# Patient Record
Sex: FEMALE | Race: WHITE | NOT HISPANIC OR LATINO | Employment: OTHER | URBAN - METROPOLITAN AREA
[De-identification: names, ages, dates, MRNs, and addresses within clinical notes are randomized per-mention and may not be internally consistent; named-entity substitution may affect disease eponyms.]

---

## 2017-01-16 ENCOUNTER — OFFICE VISIT (OUTPATIENT)
Dept: CARDIOLOGY | Facility: CLINIC | Age: 77
End: 2017-01-16

## 2017-01-16 VITALS
WEIGHT: 188 LBS | SYSTOLIC BLOOD PRESSURE: 120 MMHG | HEIGHT: 64 IN | BODY MASS INDEX: 32.1 KG/M2 | DIASTOLIC BLOOD PRESSURE: 80 MMHG | HEART RATE: 58 BPM

## 2017-01-16 DIAGNOSIS — I48.19 PERSISTENT ATRIAL FIBRILLATION (HCC): Primary | ICD-10-CM

## 2017-01-16 PROCEDURE — 93000 ELECTROCARDIOGRAM COMPLETE: CPT | Performed by: INTERNAL MEDICINE

## 2017-01-16 PROCEDURE — 99213 OFFICE O/P EST LOW 20 MIN: CPT | Performed by: INTERNAL MEDICINE

## 2017-01-16 RX ORDER — MELATONIN
1000 DAILY
COMMUNITY

## 2017-01-16 RX ORDER — CA/D3/MAG OX/ZINC/COP/MANG/BOR 600 MG-800
1 TABLET,CHEWABLE ORAL DAILY
COMMUNITY

## 2017-01-16 NOTE — PROGRESS NOTES
Date of Office Visit: 2017  Encounter Provider: Chuck Sauceda MD  Place of Service: [facility] CARDIOLOGY  Patient Name: Robyn Lambert  :1940  [id number]    Chief Complaint   Patient presents with   • Atrial Fibrillation   :     HPI: Robyn Lambert is a 76 y.o. female   She is here for followup of her persistent atrial fibrillation.  She has been feeling well.  No palpitations, chest pain, shortness of breath, PND, orthopnea, edema or syncope.  No bleeding difficulty.  Her weight is down 8 pounds.          Past Medical History   Diagnosis Date   • Abnormal stress test      myocardial perfusion   • Arthritis    • Atrial fibrillation    • Cancer    • Health care maintenance    • Hyperlipidemia    • Osteopenia        Past Surgical History   Procedure Laterality Date   • Vulva surgery     • Tonsillectomy     • Appendectomy     • Bladder surgery N/A 2008       Social History     Social History   • Marital status:      Spouse name: N/A   • Number of children: N/A   • Years of education: N/A     Occupational History   • Not on file.     Social History Main Topics   • Smoking status: Never Smoker   • Smokeless tobacco: Not on file   • Alcohol use No   • Drug use: No   • Sexual activity: Not on file     Other Topics Concern   • Not on file     Social History Narrative       Family History   Problem Relation Age of Onset   • Hypertension Mother    • Stroke Mother    • Cancer Mother    • Heart attack Father    • Sudden death Father    • Stroke Maternal Grandmother    • Heart disease Neg Hx        Review of Systems   Constitution: Negative for decreased appetite, fever, malaise/fatigue and weight loss.   HENT: Negative for nosebleeds.    Eyes: Negative for double vision.   Cardiovascular: Negative for chest pain, claudication, cyanosis, dyspnea on exertion, irregular heartbeat, leg swelling, near-syncope, orthopnea, palpitations, paroxysmal nocturnal dyspnea and  "syncope.   Respiratory: Negative for cough, hemoptysis and shortness of breath.    Hematologic/Lymphatic: Negative for bleeding problem.   Skin: Negative for rash.   Musculoskeletal: Negative for falls and myalgias.   Gastrointestinal: Negative for hematochezia, jaundice, melena, nausea and vomiting.   Genitourinary: Negative for hematuria.   Neurological: Negative for dizziness and seizures.   Psychiatric/Behavioral: Negative for altered mental status and memory loss.       Allergies   Allergen Reactions   • Latex Rash         Current Outpatient Prescriptions:   •  atenolol (TENORMIN) 25 MG tablet, 1/2 tab po BID, Disp: 90 tablet, Rfl: 3  •  cholecalciferol (VITAMIN D3) 1000 UNITS tablet, Take 1,000 Units by mouth Daily., Disp: , Rfl:   •  Probiotic Product (PROBIOTIC ADVANCED) capsule, Take  by mouth., Disp: , Rfl:   •  rivaroxaban (XARELTO) 20 MG tablet, Take 1 tablet by mouth daily. (Patient taking differently: Take 20 mg by mouth daily with dinner.), Disp: 90 tablet, Rfl: 3     Objective:     Vitals:    01/16/17 1120   BP: 120/80   Pulse: 58   Weight: 188 lb (85.3 kg)   Height: 63.5\" (161.3 cm)     Body mass index is 32.78 kg/(m^2).    Physical Exam   Constitutional: She is oriented to person, place, and time. She appears well-developed and well-nourished.   HENT:   Head: Normocephalic.   Eyes: No scleral icterus.   Neck: No JVD present. No thyromegaly present.   Cardiovascular: Normal rate and normal heart sounds.  An irregularly irregular rhythm present. Exam reveals no gallop and no friction rub.    No murmur heard.  Pulmonary/Chest: Effort normal and breath sounds normal. She has no wheezes. She has no rales.   Abdominal: Soft. There is no hepatosplenomegaly. There is no tenderness.   Musculoskeletal: Normal range of motion. She exhibits no edema.   Lymphadenopathy:     She has no cervical adenopathy.   Neurological: She is alert and oriented to person, place, and time.   Skin: Skin is warm and dry. No rash " noted.   Psychiatric: She has a normal mood and affect.         ECG 12 Lead  Date/Time: 1/16/2017 12:01 PM  Performed by: DEEP SAUCEDA  Authorized by: DEEP SAUCEDA   Comparison: compared with previous ECG   Rhythm: atrial fibrillation  Clinical impression: abnormal ECG             Assessment:       Diagnosis Plan   1. Persistent atrial fibrillation            Plan:       Atrial Fibrillation and Atrial Flutter  Assessment  • The patient has persistent atrial fibrillation  • This is non-valvular in etiology  • The patient's CHADS2-VASc score is 3  • A [id number] score of 2 or more is considered a high risk for a thromboembolic event  • Rivaroxaban prescribed    Plan  • Continue in atrial fibrillation with rate control  • Continue rivaroxaban for antithrombotic therapy, bleeding issues discussed  • Continue beta blocker for rate control      As always, it has been a pleasure to participate in your patient's care.      Sincerely,       Deep Sauceda MD

## 2017-06-23 ENCOUNTER — TRANSCRIBE ORDERS (OUTPATIENT)
Dept: ADMINISTRATIVE | Facility: HOSPITAL | Age: 77
End: 2017-06-23

## 2017-06-23 DIAGNOSIS — Z12.31 VISIT FOR SCREENING MAMMOGRAM: Primary | ICD-10-CM

## 2017-07-30 DIAGNOSIS — I48.19 PERSISTENT ATRIAL FIBRILLATION (HCC): ICD-10-CM

## 2017-07-31 RX ORDER — ATENOLOL 25 MG/1
TABLET ORAL
Qty: 90 TABLET | Refills: 1 | Status: SHIPPED | OUTPATIENT
Start: 2017-07-31 | End: 2018-01-18

## 2017-08-18 ENCOUNTER — TRANSCRIBE ORDERS (OUTPATIENT)
Dept: ADMINISTRATIVE | Facility: HOSPITAL | Age: 77
End: 2017-08-18

## 2017-08-18 DIAGNOSIS — R92.8 FOLLOW-UP EXAMINATION OF ABNORMAL MAMMOGRAM: Primary | ICD-10-CM

## 2017-11-06 DIAGNOSIS — I48.19 PERSISTENT ATRIAL FIBRILLATION (HCC): ICD-10-CM

## 2017-11-07 ENCOUNTER — HOSPITAL ENCOUNTER (OUTPATIENT)
Dept: MAMMOGRAPHY | Facility: HOSPITAL | Age: 77
Discharge: HOME | End: 2017-11-07
Admitting: FAMILY MEDICINE

## 2017-11-07 DIAGNOSIS — R92.8 FOLLOW-UP EXAMINATION OF ABNORMAL MAMMOGRAM: ICD-10-CM

## 2017-11-07 PROCEDURE — G0204 DX MAMMO INCL CAD BI: HCPCS

## 2018-01-18 ENCOUNTER — OFFICE VISIT (OUTPATIENT)
Dept: CARDIOLOGY | Facility: CLINIC | Age: 78
End: 2018-01-18

## 2018-01-18 ENCOUNTER — TELEPHONE (OUTPATIENT)
Dept: CARDIOLOGY | Facility: CLINIC | Age: 78
End: 2018-01-18

## 2018-01-18 VITALS
WEIGHT: 201 LBS | OXYGEN SATURATION: 98 % | DIASTOLIC BLOOD PRESSURE: 82 MMHG | HEIGHT: 63 IN | BODY MASS INDEX: 35.61 KG/M2 | HEART RATE: 68 BPM | SYSTOLIC BLOOD PRESSURE: 124 MMHG

## 2018-01-18 DIAGNOSIS — I48.19 PERSISTENT ATRIAL FIBRILLATION (HCC): Primary | ICD-10-CM

## 2018-01-18 PROCEDURE — 93000 ELECTROCARDIOGRAM COMPLETE: CPT | Performed by: PHYSICIAN ASSISTANT

## 2018-01-18 PROCEDURE — 99213 OFFICE O/P EST LOW 20 MIN: CPT | Performed by: PHYSICIAN ASSISTANT

## 2018-01-18 RX ORDER — INFLUENZA A VIRUS A/MICHIGAN/45/2015 X-275 (H1N1) ANTIGEN (FORMALDEHYDE INACTIVATED), INFLUENZA A VIRUS A/SINGAPORE/INFIMH-16-0019/2016 IVR-186 (H3N2) ANTIGEN (FORMALDEHYDE INACTIVATED), AND INFLUENZA B VIRUS B/MARYLAND/15/2016 BX-69A (A B/COLORADO/6/2017-LIKE VIRUS) ANTIGEN (FORMALDEHYDE INACTIVATED) 60; 60; 60 UG/.5ML; UG/.5ML; UG/.5ML
INJECTION, SUSPENSION INTRAMUSCULAR
Refills: 0 | COMMUNITY
Start: 2017-10-31 | End: 2019-02-08

## 2018-01-18 NOTE — PROGRESS NOTES
Date of Office Visit: 2018  Encounter Provider: RACHEL Mitchell  Place of Service: [facility] CARDIOLOGY  Patient Name: Robyn Lambert  :1940    Chief Complaint   Patient presents with   • Atrial Fibrillation     1 year follow up   :     HPI: Robyn Lambert is a 77 y.o. female , new to me, who presents today for follow-up.  Old records have been obtained and reviewed by me.  She is a patient of Dr. Sauceda's who we follow for persistent atrial fibrillation.  She has been rate controlled and anticoagulated.  She was last in our office to see Dr. Sauceda on 2017.  At that visit, she was stable from a cardiac standpoint without complaints of angina or heart failure, and no bleeding issues.  No changes were made to her medical regimen, and she is here today for yearly follow-up.   Over the past year she's been doing fairly well.  She denies any chest pain, shortness of breath, palpitations, edema, dizziness, or syncope.  She has gained some weight back.  She states that she had a lymph node removed from her right groin, and ever since she incurs the swelling in her right knee.  This is painful and makes it difficult for her to get any exercise.  She also has complaints of fatigue, she states that she's been tired ever since she started taking atenolol.      Past Medical History:   Diagnosis Date   • Abnormal stress test     myocardial perfusion   • Arthritis    • Atrial fibrillation    • Cancer    • Health care maintenance    • Hyperlipidemia    • Osteopenia        Past Surgical History:   Procedure Laterality Date   • APPENDECTOMY     • BLADDER SURGERY N/A 2008   • TONSILLECTOMY     • VULVA SURGERY         Social History     Social History   • Marital status:      Spouse name: N/A   • Number of children: N/A   • Years of education: N/A     Occupational History   • Not on file.     Social History Main Topics   • Smoking status: Never Smoker   • Smokeless  tobacco: Never Used   • Alcohol use No   • Drug use: No   • Sexual activity: Defer     Other Topics Concern   • Not on file     Social History Narrative       Family History   Problem Relation Age of Onset   • Hypertension Mother    • Stroke Mother    • Cancer Mother    • Heart attack Father    • Sudden death Father    • Stroke Maternal Grandmother    • No Known Problems Maternal Grandfather    • No Known Problems Paternal Grandmother    • No Known Problems Paternal Grandfather    • Heart disease Neg Hx        Review of Systems   Constitution: Positive for malaise/fatigue. Negative for chills, fever, weight gain and weight loss.   HENT: Negative for ear pain, hearing loss, nosebleeds and sore throat.    Eyes: Negative for double vision, pain and visual disturbance.   Cardiovascular: Negative for chest pain, dyspnea on exertion, irregular heartbeat, leg swelling, near-syncope, orthopnea, palpitations, paroxysmal nocturnal dyspnea and syncope.   Respiratory: Negative for cough, shortness of breath, sleep disturbances due to breathing, snoring and wheezing.    Endocrine: Negative for cold intolerance, heat intolerance and polyuria.   Skin: Negative for itching and rash.   Musculoskeletal: Positive for joint pain. Negative for joint swelling and myalgias.   Gastrointestinal: Negative for abdominal pain, diarrhea, melena, nausea and vomiting.   Genitourinary: Negative for frequency, hematuria and hesitancy.   Neurological: Negative for excessive daytime sleepiness, headaches, light-headedness, numbness, paresthesias and seizures.   Psychiatric/Behavioral: Negative for altered mental status and depression.   Allergic/Immunologic: Negative.    All other systems reviewed and are negative.      Allergies   Allergen Reactions   • Latex Rash         Current Outpatient Prescriptions:   •  atenolol (TENORMIN) 25 MG tablet, TAKE ONE-HALF (1/2) TABLET TWICE A DAY (RX CHANGE), Disp: 90 tablet, Rfl: 1  •  cholecalciferol (VITAMIN  "D3) 1000 UNITS tablet, Take 1,000 Units by mouth Daily., Disp: , Rfl:   •  estradiol (ESTRACE) 0.1 MG/GM vaginal cream, Apply small amount externally and at the vaginal opening 2-3 times weekly, Disp: , Rfl:   •  fluticasone (CUTIVATE) 0.005 % ointment, Apply  topically., Disp: , Rfl:   •  Probiotic Product (PROBIOTIC ADVANCED) capsule, Take 1 tablet by mouth Daily., Disp: , Rfl:   •  rivaroxaban (XARELTO) 20 MG tablet, Take 1 tablet by mouth Daily., Disp: 90 tablet, Rfl: 1  •  triamcinolone (KENALOG) 0.5 % cream, Apply  topically., Disp: , Rfl:   •  FLUZONE HIGH-DOSE 0.5 ML suspension prefilled syringe injection, ADM 0.5ML IM UTD, Disp: , Rfl: 0     Objective:     Vitals:    01/18/18 0931 01/18/18 0942   BP: 120/80 124/82   BP Location: Right arm Left arm   Pulse: 68    SpO2: 98%    Weight: 91.2 kg (201 lb)    Height: 160 cm (63\")      Body mass index is 35.61 kg/(m^2).    PHYSICAL EXAM:    Physical Exam   Constitutional: She is oriented to person, place, and time. She appears well-developed and well-nourished. No distress.   HENT:   Head: Normocephalic and atraumatic.   Eyes: Pupils are equal, round, and reactive to light.   Neck: No JVD present. No thyromegaly present.   Cardiovascular: Normal rate, normal heart sounds and intact distal pulses.  An irregular rhythm present.   No murmur heard.  Pulmonary/Chest: Effort normal and breath sounds normal. No respiratory distress.   Abdominal: Soft. Bowel sounds are normal. She exhibits no distension. There is no splenomegaly or hepatomegaly. There is no tenderness.   Musculoskeletal: Normal range of motion. She exhibits no edema.   Neurological: She is alert and oriented to person, place, and time.   Skin: Skin is warm and dry. She is not diaphoretic. No erythema.   Psychiatric: She has a normal mood and affect. Her behavior is normal. Judgment normal.         ECG 12 Lead  Date/Time: 1/18/2018 9:47 AM  Performed by: SREEDHAR LE  Authorized by: SREEDHAR LE "   Comparison: compared with previous ECG from 1/16/2017  Similar to previous ECG  Rhythm: atrial fibrillation  BPM: 68  Clinical impression: abnormal ECG  Comments: Indication: Persistent atrial fibrillation.              Assessment:       Diagnosis Plan   1. Persistent atrial fibrillation  ECG 12 Lead     Orders Placed This Encounter   Procedures   • ECG 12 Lead     This order was created via procedure documentation          Plan:       1.  Atrial Fibrillation and Atrial Flutter  Assessment  • The patient has persistent atrial fibrillation  • This is non-valvular in etiology  • The patient's CHADS2-VASc score is 3  • A [id number] score of 2 or more is considered a high risk for a thromboembolic event  • Rivaroxaban prescribed    Plan  • Continue in atrial fibrillation with rate control  • Continue rivaroxaban for antithrombotic therapy, bleeding issues discussed  • Continue beta blocker for rate control    Subjective - Objective  • Overall she stable.  Her main complaints today are fatigue, and she thinks it is related to the atenolol.  She is currently taking half a pill in the morning and half a pill in the evening in order to try to stay awake during the daytime.  I did suggest that she try to take a whole pill, 25 mg, in the evening.  She is going to try this.  I also wonder if she really needs to be on the atenolol, as there is no real documentation of rapid ventricular response.  She is in chronic atrial fibrillation and anticoagulated, she is not having any bleeding issues.  I'm going to discuss possible discontinuation of the atenolol with Dr. Sauceda.  Otherwise, she will follow up with him in 1 year.        As always, it has been a pleasure to participate in your patient's care.      Sincerely,         Citlalli Carias PA-C

## 2018-01-18 NOTE — TELEPHONE ENCOUNTER
I spoke with Dr. Sauceda.  He is amenable to discontinuing the atenolol altogether.  However, we will have her come in in 1 week and check a Holter monitor for 24 hours to make sure she is not tachycardic.  The patient would like to pursue this plan of care.      Apryl, can you please call and set her up for a Holter monitor to be placed next Wednesday or Thursday?

## 2018-02-02 ENCOUNTER — TELEPHONE (OUTPATIENT)
Dept: CARDIOLOGY | Facility: CLINIC | Age: 78
End: 2018-02-02

## 2018-02-02 NOTE — TELEPHONE ENCOUNTER
I spoke with the patient.  Overall she feels much better since stopping the atenolol.  Her average heart rate was in the 90s.  I've asked her to keep track of her heart rate and it is consistently running over 100 to give me a call.  She indicated that she understood.

## 2018-05-08 DIAGNOSIS — I48.19 PERSISTENT ATRIAL FIBRILLATION (HCC): ICD-10-CM

## 2018-05-08 RX ORDER — RIVAROXABAN 20 MG/1
TABLET, FILM COATED ORAL
Qty: 90 TABLET | Refills: 1 | Status: SHIPPED | OUTPATIENT
Start: 2018-05-08 | End: 2018-11-04 | Stop reason: SDUPTHER

## 2018-05-09 ENCOUNTER — TELEPHONE (OUTPATIENT)
Dept: CARDIOLOGY | Facility: CLINIC | Age: 78
End: 2018-05-09

## 2018-05-09 NOTE — TELEPHONE ENCOUNTER
Patient needing to have a radical vulvectomy under general anesthesia  Needs clearance  Having done on May 22nd    [phone]. [phone]  [phone]

## 2018-05-10 NOTE — TELEPHONE ENCOUNTER
She is at an acceptable risk.  She has persistent AF and will need to stop the AC 3 days prior and resume when safe from a surgical standpoint.  SHe had a nl nuke and echo 2 years ago.

## 2018-11-04 DIAGNOSIS — I48.19 PERSISTENT ATRIAL FIBRILLATION (HCC): ICD-10-CM

## 2018-11-05 RX ORDER — RIVAROXABAN 20 MG/1
TABLET, FILM COATED ORAL
Qty: 90 TABLET | Refills: 1 | Status: SHIPPED | OUTPATIENT
Start: 2018-11-05 | End: 2019-06-03 | Stop reason: SDUPTHER

## 2018-12-17 ENCOUNTER — TRANSCRIBE ORDERS (OUTPATIENT)
Dept: ADMINISTRATIVE | Facility: HOSPITAL | Age: 78
End: 2018-12-17

## 2018-12-17 DIAGNOSIS — Z12.31 VISIT FOR SCREENING MAMMOGRAM: Primary | ICD-10-CM

## 2019-01-21 ENCOUNTER — OFFICE VISIT (OUTPATIENT)
Dept: CARDIOLOGY | Facility: CLINIC | Age: 79
End: 2019-01-21

## 2019-01-21 VITALS
SYSTOLIC BLOOD PRESSURE: 130 MMHG | OXYGEN SATURATION: 99 % | BODY MASS INDEX: 34.73 KG/M2 | HEIGHT: 63 IN | WEIGHT: 196 LBS | HEART RATE: 87 BPM | DIASTOLIC BLOOD PRESSURE: 82 MMHG

## 2019-01-21 DIAGNOSIS — I48.19 PERSISTENT ATRIAL FIBRILLATION (HCC): Primary | ICD-10-CM

## 2019-01-21 PROCEDURE — 99213 OFFICE O/P EST LOW 20 MIN: CPT | Performed by: PHYSICIAN ASSISTANT

## 2019-01-21 PROCEDURE — 93000 ELECTROCARDIOGRAM COMPLETE: CPT | Performed by: PHYSICIAN ASSISTANT

## 2019-01-21 NOTE — PROGRESS NOTES
Date of Office Visit: 2019  Encounter Provider: RACHEL Harmon  Place of Service: [facility] CARDIOLOGY  Patient Name: Robyn Lambert  :1940    Chief Complaint   Patient presents with   • Atrial Fibrillation     1 year follow up   :     HPI: Robyn Lambert is a 78 y.o. female who presents today for follow-up.  Old records have been obtained and reviewed by me.  She is a patient of Dr. Sauceda's with a past cardiac history significant for persistent atrial fibrillation.  She has been rate controlled and anticoagulated.  She was last in our office to see me on 2018.  At that visit, she was doing well from a cardiac standpoint.  She had some complaints of fatigue ever since she started atenolol.  Ultimately we ended up discontinuing the atenolol and then placing a Holter monitor on her.  Her average heart rate was in the 90s, and she felt better after stopping the atenolol.  She's here today for yearly visit.   Over the past year she's been doing well from a cardiac standpoint.  She denies any chest pain, shortness of breath, palpitations, edema, dizziness, or syncope.  She has not had any bleeding issues or melena on the Xarelto.  She has not noticed her heart rate being elevated.  She is the primary caregiver for her , who is also a patient here at our practice.  Much of our discussion today was around how difficult it is for her to care for him.      Past Medical History:   Diagnosis Date   • Abnormal stress test     myocardial perfusion   • Arthritis    • Atrial fibrillation (CMS/HCC)    • Cancer (CMS/HCC)    • Health care maintenance    • Hyperlipidemia    • Osteopenia        Past Surgical History:   Procedure Laterality Date   • APPENDECTOMY     • BLADDER SURGERY N/A 2008   • TONSILLECTOMY     • VULVA SURGERY         Social History     Socioeconomic History   • Marital status:      Spouse name: Not on file   • Number of children: Not on  file   • Years of education: Not on file   • Highest education level: Not on file   Social Needs   • Financial resource strain: Not on file   • Food insecurity - worry: Not on file   • Food insecurity - inability: Not on file   • Transportation needs - medical: Not on file   • Transportation needs - non-medical: Not on file   Occupational History   • Not on file   Tobacco Use   • Smoking status: Never Smoker   • Smokeless tobacco: Never Used   Substance and Sexual Activity   • Alcohol use: No   • Drug use: No   • Sexual activity: Defer   Other Topics Concern   • Not on file   Social History Narrative   • Not on file       Family History   Problem Relation Age of Onset   • Hypertension Mother    • Stroke Mother    • Cancer Mother    • Heart attack Father    • Sudden death Father    • Stroke Maternal Grandmother    • No Known Problems Maternal Grandfather    • No Known Problems Paternal Grandmother    • No Known Problems Paternal Grandfather    • Heart disease Neg Hx        Review of Systems   Constitution: Negative for chills, fever and malaise/fatigue.   Cardiovascular: Negative for chest pain, dyspnea on exertion, leg swelling, near-syncope, orthopnea, palpitations, paroxysmal nocturnal dyspnea and syncope.   Respiratory: Negative for cough and shortness of breath.    Musculoskeletal: Negative for joint pain, joint swelling and myalgias.   Gastrointestinal: Negative for abdominal pain, diarrhea, melena, nausea and vomiting.   Genitourinary: Negative for frequency and hematuria.   Neurological: Negative for light-headedness, numbness, paresthesias and seizures.   Allergic/Immunologic: Negative.    All other systems reviewed and are negative.      Allergies   Allergen Reactions   • Latex Rash         Current Outpatient Medications:   •  cholecalciferol (VITAMIN D3) 1000 UNITS tablet, Take 1,000 Units by mouth Daily., Disp: , Rfl:   •  estradiol (ESTRACE) 0.1 MG/GM vaginal cream, Apply small amount externally and at  "the vaginal opening 2-3 times weekly, Disp: , Rfl:   •  fluticasone (CUTIVATE) 0.005 % ointment, Apply  topically., Disp: , Rfl:   •  FLUZONE HIGH-DOSE 0.5 ML suspension prefilled syringe injection, ADM 0.5ML IM UTD, Disp: , Rfl: 0  •  Probiotic Product (PROBIOTIC ADVANCED) capsule, Take 1 tablet by mouth Daily., Disp: , Rfl:   •  triamcinolone (KENALOG) 0.5 % cream, Apply  topically., Disp: , Rfl:   •  XARELTO 20 MG tablet, TAKE 1 TABLET DAILY, Disp: 90 tablet, Rfl: 1      Objective:     Vitals:    01/21/19 0851 01/21/19 0900   BP: 128/88 130/82   BP Location: Right arm Left arm   Pulse: 87    SpO2: 99%    Weight: 88.9 kg (196 lb)    Height: 160 cm (63\")      Body mass index is 34.72 kg/m².    PHYSICAL EXAM:    Physical Exam   Constitutional: She is oriented to person, place, and time. She appears well-developed and well-nourished. No distress.   HENT:   Head: Normocephalic and atraumatic.   Eyes: Pupils are equal, round, and reactive to light.   Neck: No JVD present. No thyromegaly present.   Cardiovascular: Normal rate, normal heart sounds and intact distal pulses. An irregular rhythm present.   No murmur heard.  Pulmonary/Chest: Effort normal and breath sounds normal. No respiratory distress.   Abdominal: Soft. Bowel sounds are normal. She exhibits no distension. There is no splenomegaly or hepatomegaly. There is no tenderness.   Musculoskeletal: Normal range of motion. She exhibits no edema.   Neurological: She is alert and oriented to person, place, and time.   Skin: Skin is warm and dry. She is not diaphoretic. No erythema.   Psychiatric: She has a normal mood and affect. Her behavior is normal. Judgment normal.         ECG 12 Lead  Date/Time: 1/21/2019 9:12 AM  Performed by: Citlalli rKuse PA  Authorized by: Citlalli Kruse PA   Comparison: compared with previous ECG from 1/18/2018  Similar to previous ECG  Rhythm: atrial fibrillation  BPM: 90  Clinical impression: abnormal ECG  Comments: Indication: " Atrial fibrillation.              Assessment:       Diagnosis Plan   1. Persistent atrial fibrillation (CMS/[facility])  ECG 12 Lead     Orders Placed This Encounter   Procedures   • ECG 12 Lead     This order was created via procedure documentation          Plan:       Atrial Fibrillation and Atrial Flutter  Assessment  • The patient has persistent atrial fibrillation  • This is non-valvular in etiology  • The patient's CHADS2-VASc score is 3  • A [id number]-HKAm score of 2 or more is considered a high risk for a thromboembolic event  • Rivaroxaban prescribed    Plan  • Continue in atrial fibrillation with rate control  • Continue rivaroxaban for antithrombotic therapy, bleeding issues discussed    Subjective - Objective  • Overall she stable and doing well.  She is anticoagulated.  We have her on atenolol, however she was very fatigued with this and we discontinued it.  Her heart rate today is 90, and I'm fine with this.  Again, much of our discussion today was around the fact that she is spending a lot of energy caring for her .  I've encouraged her to try to take care of herself, and to reach out to her adult children for help.  I'm not going to make any changes to her medical regimen, and she will follow-up with Dr. Sauceda in 1 year or sooner if needed.        As always, it has been a pleasure to participate in your patient's care.      Sincerely,         Citlalli Kruse PA-C

## 2019-01-24 ENCOUNTER — HOSPITAL ENCOUNTER (OUTPATIENT)
Dept: MAMMOGRAPHY | Facility: HOSPITAL | Age: 79
Discharge: HOME | End: 2019-01-24
Admitting: FAMILY MEDICINE

## 2019-01-24 DIAGNOSIS — Z12.31 VISIT FOR SCREENING MAMMOGRAM: ICD-10-CM

## 2019-01-24 PROCEDURE — 77063 BREAST TOMOSYNTHESIS BI: CPT

## 2019-01-24 PROCEDURE — 77067 SCR MAMMO BI INCL CAD: CPT

## 2019-04-23 ENCOUNTER — OFFICE VISIT (OUTPATIENT)
Dept: SURGERY | Facility: CLINIC | Age: 79
End: 2019-04-23

## 2019-04-23 VITALS
HEIGHT: 63 IN | SYSTOLIC BLOOD PRESSURE: 130 MMHG | OXYGEN SATURATION: 98 % | HEART RATE: 83 BPM | WEIGHT: 195.7 LBS | DIASTOLIC BLOOD PRESSURE: 84 MMHG | BODY MASS INDEX: 34.68 KG/M2

## 2019-04-23 DIAGNOSIS — R10.11 RIGHT UPPER QUADRANT ABDOMINAL PAIN: Primary | ICD-10-CM

## 2019-04-23 PROCEDURE — 99203 OFFICE O/P NEW LOW 30 MIN: CPT | Performed by: SURGERY

## 2019-05-06 ENCOUNTER — LAB REQUISITION (OUTPATIENT)
Dept: LAB | Facility: HOSPITAL | Age: 79
End: 2019-05-06

## 2019-05-06 ENCOUNTER — OUTSIDE FACILITY SERVICE (OUTPATIENT)
Dept: SURGERY | Facility: CLINIC | Age: 79
End: 2019-05-06

## 2019-05-06 DIAGNOSIS — K80.80 OTHER CHOLELITHIASIS WITHOUT OBSTRUCTION: ICD-10-CM

## 2019-05-06 PROCEDURE — 88304 TISSUE EXAM BY PATHOLOGIST: CPT | Performed by: SURGERY

## 2019-05-06 PROCEDURE — 47562 LAPAROSCOPIC CHOLECYSTECTOMY: CPT | Performed by: SURGERY

## 2019-05-08 LAB
CYTO UR: NORMAL
LAB AP CASE REPORT: NORMAL
LAB AP CLINICAL INFORMATION: NORMAL
PATH REPORT.FINAL DX SPEC: NORMAL
PATH REPORT.GROSS SPEC: NORMAL

## 2019-05-22 ENCOUNTER — OFFICE VISIT (OUTPATIENT)
Dept: SURGERY | Facility: CLINIC | Age: 79
End: 2019-05-22

## 2019-05-22 VITALS
SYSTOLIC BLOOD PRESSURE: 132 MMHG | DIASTOLIC BLOOD PRESSURE: 80 MMHG | RESPIRATION RATE: 18 BRPM | HEIGHT: 63 IN | WEIGHT: 198 LBS | BODY MASS INDEX: 35.08 KG/M2

## 2019-05-22 DIAGNOSIS — Z09 FOLLOW UP: Primary | ICD-10-CM

## 2019-05-22 PROCEDURE — 99024 POSTOP FOLLOW-UP VISIT: CPT | Performed by: SURGERY

## 2019-05-22 NOTE — PROGRESS NOTES
"Chief complaint:  Post-op  Follow up    History of Present Illness    This is Robyn Lambert 78 y.o. status post laparoscopic cholecystectomy and is doing very well.  Patient denies fever, chills, nausea or vomiting.  Patient's pain is well-controlled.      The following portions of the patient's history were reviewed and updated as appropriate: allergies, current medications, past family history, past medical history, past social history, past surgical history and problem list.    Vitals:    05/22/19 1107   BP: 132/80   Resp: 18   Weight: 89.8 kg (198 lb)   Height: 160 cm (63\")       Physical Exam  Gen.: Patient is alert and oriented ×3, no acute distress  HEENT: Normal cephalic, atraumatic, moist mucous membranes, anicteric  Incision is well-healed without evidence of infection, herniation or seroma.    Assessment/plan:    This is Robyn Lambert 78 y.o. status post laparoscopic cholecystectomy and is doing very well.  I have instructed the patient not lift greater than 10 pounds for total of 6 weeks from the time of surgery. I have instructed the patient follow-up as needed.    Denise Edouard MD  General, Minimally Invasive and Endoscopic Surgery  [facility], [facility]    [address] [facility] [address] [place] [address]   [address]    [address]  [place], [place] [id number] [place] [id number]    P: [phone]  F: [phone]    Cc:  Yessica Banks MD  "

## 2019-06-03 DIAGNOSIS — I48.19 PERSISTENT ATRIAL FIBRILLATION (HCC): ICD-10-CM

## 2019-06-03 RX ORDER — RIVAROXABAN 20 MG/1
TABLET, FILM COATED ORAL
Qty: 90 TABLET | Refills: 1 | Status: SHIPPED | OUTPATIENT
Start: 2019-06-03 | End: 2019-11-30 | Stop reason: SDUPTHER

## 2019-11-30 DIAGNOSIS — I48.19 PERSISTENT ATRIAL FIBRILLATION (HCC): ICD-10-CM

## 2019-12-02 RX ORDER — RIVAROXABAN 20 MG/1
TABLET, FILM COATED ORAL
Qty: 90 TABLET | Refills: 4 | Status: SHIPPED | OUTPATIENT
Start: 2019-12-02 | End: 2021-01-04 | Stop reason: SDUPTHER

## 2020-01-22 ENCOUNTER — TRANSCRIBE ORDERS (OUTPATIENT)
Dept: ADMINISTRATIVE | Facility: HOSPITAL | Age: 80
End: 2020-01-22

## 2020-01-22 DIAGNOSIS — Z12.31 SCREENING MAMMOGRAM, ENCOUNTER FOR: Primary | ICD-10-CM

## 2020-01-28 ENCOUNTER — HOSPITAL ENCOUNTER (OUTPATIENT)
Dept: MAMMOGRAPHY | Facility: HOSPITAL | Age: 80
Discharge: HOME | End: 2020-01-28
Admitting: FAMILY MEDICINE

## 2020-01-28 DIAGNOSIS — Z12.31 SCREENING MAMMOGRAM, ENCOUNTER FOR: ICD-10-CM

## 2020-01-28 PROCEDURE — 77063 BREAST TOMOSYNTHESIS BI: CPT

## 2020-01-28 PROCEDURE — 77067 SCR MAMMO BI INCL CAD: CPT

## 2020-02-07 ENCOUNTER — OFFICE VISIT (OUTPATIENT)
Dept: CARDIOLOGY | Facility: CLINIC | Age: 80
End: 2020-02-07

## 2020-02-07 VITALS
DIASTOLIC BLOOD PRESSURE: 78 MMHG | BODY MASS INDEX: 35.51 KG/M2 | WEIGHT: 200.4 LBS | HEIGHT: 63 IN | SYSTOLIC BLOOD PRESSURE: 110 MMHG | HEART RATE: 77 BPM

## 2020-02-07 DIAGNOSIS — I48.11 LONGSTANDING PERSISTENT ATRIAL FIBRILLATION (HCC): Primary | ICD-10-CM

## 2020-02-07 PROCEDURE — 99214 OFFICE O/P EST MOD 30 MIN: CPT | Performed by: INTERNAL MEDICINE

## 2020-02-07 PROCEDURE — 93000 ELECTROCARDIOGRAM COMPLETE: CPT | Performed by: INTERNAL MEDICINE

## 2020-02-07 NOTE — PROGRESS NOTES
Date of Office Visit: 20  Encounter Provider: Chuck Sauceda MD  Place of Service: [facility] CARDIOLOGY  Patient Name: Robyn Lambert  :1940  [id number]    Chief Complaint   Patient presents with   • Atrial Fibrillation   • 1 year follow up   :     HPI: Robyn Lambert is a 79 y.o. female she is here for follow-up she has persistent A. fib she is asymptomatic with that we have rate controlled her and anticoagulate her.  Is under a lot of stress her  is really struggled with his health issues he has had some heart issues I take care of him but he has had some other problems so on with scattered very emotional features not any bleeding difficulties no shortness of breath no PND orthopnea edema    Past Medical History:   Diagnosis Date   • Abdominal pain    • Abnormal stress test     myocardial perfusion   • Arthritis    • Atrial fibrillation (CMS/HCC)    • Cancer (CMS/HCC)    • Cholelithiasis    • Health care maintenance    • Hyperlipidemia    • Osteopenia        Past Surgical History:   Procedure Laterality Date   • APPENDECTOMY     • BLADDER SURGERY N/A 2008   • COLONOSCOPY     • TONSILLECTOMY     • VULVA SURGERY         Social History     Socioeconomic History   • Marital status:      Spouse name: Not on file   • Number of children: 3   • Years of education: Not on file   • Highest education level: Not on file   Occupational History   • Occupation: retired   Social Needs   • Financial resource strain: Patient refused   • Food insecurity:     Worry: Never true     Inability: Never true   • Transportation needs:     Medical: Not on file     Non-medical: No   Tobacco Use   • Smoking status: Never Smoker   • Smokeless tobacco: Never Used   Substance and Sexual Activity   • Alcohol use: No     Comment: caffeine use    • Drug use: No   • Sexual activity: Defer   Lifestyle   • Physical activity:     Days per week: Patient refused     Minutes per session:  Patient refused   • Stress: To some extent   Relationships   • Social connections:     Talks on phone: Patient refused     Gets together: Patient refused     Attends Mu-[employer] service: Patient refused     Active member of club or organization: Patient refused     Attends meetings of clubs or organizations: Patient refused     Relationship status: Patient refused       Family History   Problem Relation Age of Onset   • Hypertension Mother    • Stroke Mother    • Cancer Mother    • Colon cancer Mother    • Heart attack Father    • Sudden death Father    • Heart disease Father    • Stroke Maternal Grandmother    • No Known Problems Maternal Grandfather    • No Known Problems Paternal Grandmother    • No Known Problems Paternal Grandfather        Review of Systems   Constitution: Negative for decreased appetite, fever, malaise/fatigue and weight loss.   HENT: Negative for nosebleeds.    Eyes: Negative for double vision.   Cardiovascular: Negative for chest pain, claudication, cyanosis, dyspnea on exertion, irregular heartbeat, leg swelling, near-syncope, orthopnea, palpitations, paroxysmal nocturnal dyspnea and syncope.   Respiratory: Negative for cough, hemoptysis and shortness of breath.    Hematologic/Lymphatic: Negative for bleeding problem.   Skin: Negative for rash.   Musculoskeletal: Negative for falls and myalgias.   Gastrointestinal: Negative for hematochezia, jaundice, melena, nausea and vomiting.   Genitourinary: Negative for hematuria.   Neurological: Negative for dizziness and seizures.   Psychiatric/Behavioral: Negative for altered mental status and memory loss.       Allergies   Allergen Reactions   • Latex Rash         Current Outpatient Medications:   •  cholecalciferol (VITAMIN D3) 1000 UNITS tablet, Take 1,000 Units by mouth Daily., Disp: , Rfl:   •  Probiotic Product (PROBIOTIC ADVANCED) capsule, Take 1 tablet by mouth Daily., Disp: , Rfl:   •  XARELTO 20 MG tablet, TAKE 1 TABLET DAILY, Disp: 90  "tablet, Rfl: 4  •  estradiol (ESTRACE) 0.1 MG/GM vaginal cream, Apply small amount externally and at the vaginal opening 2-3 times weekly, Disp: , Rfl:   •  fluticasone (CUTIVATE) 0.005 % ointment, Apply  topically., Disp: , Rfl:   •  naproxen sodium (ANAPROX) 550 MG tablet, Take 1 tablet by mouth 2 (Two) Times a Day With Meals., Disp: 15 tablet, Rfl: 0  •  triamcinolone (KENALOG) 0.5 % cream, Apply  topically., Disp: , Rfl:       Objective:     Vitals:    02/07/20 1314   BP: 110/78   BP Location: Left arm   Patient Position: Sitting   Cuff Size: Adult   Pulse: 77   Weight: 90.9 kg (200 lb 6.4 oz)   Height: 160 cm (63\")     Body mass index is 35.5 kg/m².    Physical Exam   Constitutional: She is oriented to person, place, and time. She appears well-developed and well-nourished.   HENT:   Head: Normocephalic.   Eyes: No scleral icterus.   Neck: No JVD present. No thyromegaly present.   Cardiovascular: Normal rate and normal heart sounds. An irregularly irregular rhythm present. Exam reveals no gallop and no friction rub.   No murmur heard.  Pulmonary/Chest: Effort normal and breath sounds normal. She has no wheezes. She has no rales.   Abdominal: Soft. There is no hepatosplenomegaly. There is no tenderness.   Musculoskeletal: Normal range of motion. She exhibits no edema.   Lymphadenopathy:     She has no cervical adenopathy.   Neurological: She is alert and oriented to person, place, and time.   Skin: Skin is warm and dry. No rash noted.   Psychiatric: She has a normal mood and affect.         ECG 12 Lead  Date/Time: 2/7/2020 1:50 PM  Performed by: Chuck Sauceda MD  Authorized by: Chuck Sauceda MD   Comparison: compared with previous ECG   Similar to previous ECG  Rhythm: atrial fibrillation    Clinical impression: abnormal EKG             Assessment:       Diagnosis Plan   1. Longstanding persistent atrial fibrillation            Plan:       Her rate is well controlled she is anticoagulated from a cardiac " standpoint she is doing really well I am sorry about her 's health and how it is deteriorated but she is doing well I am and have her come back and see me in a year sooner if she has trouble    As always, it has been a pleasure to participate in your patient's care.      Sincerely,       Chuck Sauceda MD

## 2021-01-04 DIAGNOSIS — I48.19 PERSISTENT ATRIAL FIBRILLATION (HCC): ICD-10-CM

## 2021-04-02 ENCOUNTER — OFFICE VISIT (OUTPATIENT)
Dept: CARDIOLOGY | Facility: CLINIC | Age: 81
End: 2021-04-02

## 2021-04-02 VITALS
HEIGHT: 64 IN | HEART RATE: 78 BPM | WEIGHT: 201 LBS | BODY MASS INDEX: 34.31 KG/M2 | DIASTOLIC BLOOD PRESSURE: 86 MMHG | SYSTOLIC BLOOD PRESSURE: 148 MMHG

## 2021-04-02 DIAGNOSIS — I48.11 LONGSTANDING PERSISTENT ATRIAL FIBRILLATION (HCC): Primary | ICD-10-CM

## 2021-04-02 PROCEDURE — 93000 ELECTROCARDIOGRAM COMPLETE: CPT | Performed by: INTERNAL MEDICINE

## 2021-04-02 PROCEDURE — 99213 OFFICE O/P EST LOW 20 MIN: CPT | Performed by: INTERNAL MEDICINE

## 2021-04-02 NOTE — PROGRESS NOTES
Date of Office Visit: 21  Encounter Provider: Chuck Sauceda MD  Place of Service: [facility] CARDIOLOGY  Patient Name: Robyn Lambert  :1940  [id number]    Chief Complaint   Patient presents with   • Atrial Fibrillation   :     HPI: Robyn Lambert is a 80 y.o. female she is here for follow-up she has persistent A. fib she is asymptomatic with that we have rate controlled her and anticoagulate her.  She has been doing well she has not had any palpitations no PND orthopnea edema syncope no bleeding difficulty.  She never feels anything with her A. fib    Past Medical History:   Diagnosis Date   • Abdominal pain    • Abnormal stress test     myocardial perfusion   • Arthritis    • Atrial fibrillation (CMS/HCC)    • Cancer (CMS/HCC)    • Cholelithiasis    • Health care maintenance    • Hyperlipidemia    • Osteopenia        Past Surgical History:   Procedure Laterality Date   • APPENDECTOMY     • BLADDER SURGERY N/A 2008   • COLONOSCOPY     • TONSILLECTOMY     • VULVA SURGERY         Social History     Socioeconomic History   • Marital status:      Spouse name: Not on file   • Number of children: 3   • Years of education: Not on file   • Highest education level: Not on file   Tobacco Use   • Smoking status: Never Smoker   • Smokeless tobacco: Never Used   Substance and Sexual Activity   • Alcohol use: No     Comment: caffeine use    • Drug use: No   • Sexual activity: Defer       Family History   Problem Relation Age of Onset   • Hypertension Mother    • Stroke Mother    • Cancer Mother    • Colon cancer Mother    • Heart attack Father    • Sudden death Father    • Heart disease Father    • Stroke Maternal Grandmother    • No Known Problems Maternal Grandfather    • No Known Problems Paternal Grandmother    • No Known Problems Paternal Grandfather        Review of Systems   Constitutional: Negative for decreased appetite, fever, malaise/fatigue and weight loss.  "  HENT: Negative for nosebleeds.    Eyes: Negative for double vision.   Cardiovascular: Negative for chest pain, claudication, cyanosis, dyspnea on exertion, irregular heartbeat, leg swelling, near-syncope, orthopnea, palpitations, paroxysmal nocturnal dyspnea and syncope.   Respiratory: Negative for cough, hemoptysis and shortness of breath.    Hematologic/Lymphatic: Negative for bleeding problem.   Skin: Negative for rash.   Musculoskeletal: Negative for falls and myalgias.   Gastrointestinal: Negative for hematochezia, jaundice, melena, nausea and vomiting.   Genitourinary: Negative for hematuria.   Neurological: Negative for dizziness and seizures.   Psychiatric/Behavioral: Negative for altered mental status and memory loss.       Allergies   Allergen Reactions   • Latex Rash         Current Outpatient Medications:   •  cholecalciferol (VITAMIN D3) 1000 UNITS tablet, Take 1,000 Units by mouth Daily., Disp: , Rfl:   •  estradiol (ESTRACE) 0.1 MG/GM vaginal cream, Apply small amount externally and at the vaginal opening 2-3 times weekly, Disp: , Rfl:   •  fluticasone (CUTIVATE) 0.005 % ointment, Apply  topically., Disp: , Rfl:   •  rivaroxaban (Xarelto) 20 MG tablet, Take 1 tablet by mouth Daily., Disp: 90 tablet, Rfl: 2  •  triamcinolone (KENALOG) 0.5 % cream, Apply  topically., Disp: , Rfl:   •  naproxen sodium (ANAPROX) 550 MG tablet, Take 1 tablet by mouth 2 (Two) Times a Day With Meals., Disp: 15 tablet, Rfl: 0  •  Probiotic Product (PROBIOTIC ADVANCED) capsule, Take 1 tablet by mouth Daily., Disp: , Rfl:       Objective:     Vitals:    04/02/21 1322   BP: 148/86   Pulse: 78   Weight: 91.2 kg (201 lb)   Height: 162.6 cm (64\")     Body mass index is 34.5 kg/m².    Physical Exam  Constitutional:       Appearance: She is well-developed.   HENT:      Head: Normocephalic.   Eyes:      General: No scleral icterus.  Neck:      Thyroid: No thyromegaly.      Vascular: No JVD.   Cardiovascular:      Rate and Rhythm: " Normal rate. Rhythm irregularly irregular.      Heart sounds: Normal heart sounds. No murmur heard.   No friction rub. No gallop.    Pulmonary:      Effort: Pulmonary effort is normal.      Breath sounds: Normal breath sounds. No wheezing or rales.   Abdominal:      Palpations: Abdomen is soft.      Tenderness: There is no abdominal tenderness.   Musculoskeletal:         General: Normal range of motion.   Lymphadenopathy:      Cervical: No cervical adenopathy.   Skin:     General: Skin is warm and dry.      Findings: No rash.   Neurological:      Mental Status: She is alert and oriented to person, place, and time.           ECG 12 Lead    Date/Time: 4/2/2021 1:49 PM  Performed by: Chuck Sauceda MD  Authorized by: Chuck Sauceda MD   Comparison: compared with previous ECG   Similar to previous ECG  Rhythm: atrial fibrillation  Other findings: non-specific ST-T wave changes    Clinical impression: abnormal EKG             Assessment:       Diagnosis Plan   1. Longstanding persistent atrial fibrillation (CMS/HCC)            Plan:       I do not see any big changes right now continue her on her current regimen which is basically Xarelto.  She says that her blood pressure is usually better than what it is today, and she recently had a checked it was in the 120s over 80s so I am not to make a change of that, have her come back and see us in a year see me in 2 years    As always, it has been a pleasure to participate in your patient's care.      Sincerely,       Chuck Sauceda MD

## 2021-08-20 DIAGNOSIS — I48.19 PERSISTENT ATRIAL FIBRILLATION (HCC): ICD-10-CM

## 2021-08-20 RX ORDER — RIVAROXABAN 20 MG/1
TABLET, FILM COATED ORAL
Qty: 90 TABLET | Refills: 3 | Status: SHIPPED | OUTPATIENT
Start: 2021-08-20 | End: 2022-10-18

## 2021-09-22 ENCOUNTER — TRANSCRIBE ORDERS (OUTPATIENT)
Dept: ADMINISTRATIVE | Facility: HOSPITAL | Age: 81
End: 2021-09-22

## 2021-09-22 DIAGNOSIS — Z12.31 VISIT FOR SCREENING MAMMOGRAM: Primary | ICD-10-CM

## 2021-12-06 ENCOUNTER — HOSPITAL ENCOUNTER (OUTPATIENT)
Dept: MAMMOGRAPHY | Facility: HOSPITAL | Age: 81
Discharge: HOME | End: 2021-12-06
Admitting: FAMILY MEDICINE

## 2021-12-06 DIAGNOSIS — Z12.31 VISIT FOR SCREENING MAMMOGRAM: ICD-10-CM

## 2021-12-06 PROCEDURE — 77063 BREAST TOMOSYNTHESIS BI: CPT

## 2021-12-06 PROCEDURE — 77067 SCR MAMMO BI INCL CAD: CPT

## 2022-04-06 ENCOUNTER — OFFICE VISIT (OUTPATIENT)
Dept: CARDIOLOGY | Facility: CLINIC | Age: 82
End: 2022-04-06

## 2022-04-06 VITALS
SYSTOLIC BLOOD PRESSURE: 132 MMHG | WEIGHT: 199 LBS | HEART RATE: 91 BPM | DIASTOLIC BLOOD PRESSURE: 88 MMHG | BODY MASS INDEX: 33.97 KG/M2 | HEIGHT: 64 IN

## 2022-04-06 DIAGNOSIS — I48.11 LONGSTANDING PERSISTENT ATRIAL FIBRILLATION: Primary | ICD-10-CM

## 2022-04-06 PROCEDURE — 99214 OFFICE O/P EST MOD 30 MIN: CPT | Performed by: NURSE PRACTITIONER

## 2022-04-06 PROCEDURE — 93000 ELECTROCARDIOGRAM COMPLETE: CPT | Performed by: NURSE PRACTITIONER

## 2022-04-06 NOTE — PROGRESS NOTES
Date of Office Visit: 2022  Encounter Provider: ALEX Solorio  Place of Service: [facility] CARDIOLOGY  Patient Name: Robyn Lambert  :1940    Chief Complaint   Patient presents with   • Follow-up   • Atrial Fibrillation   :     HPI: Robyn Lambert is a 81 y.o. female who is a patient of Dr. Sauceda and is new to me today.  She has a history of persistent A. fib and has been asymptomatic in the past.  She is rate controlled and on anticoagulation.  She was last in the office a year ago doing well.    There has been no real change in any of her symptoms.  She does not feel that she is in A. fib.  She has recently started having some issues with indigestion.  She has been seeing her PCP for that.  She does take her Xarelto at night.  Previous testing and notes have been reviewed by me.   Past Medical History:   Diagnosis Date   • Abdominal pain    • Abnormal stress test     myocardial perfusion   • Arthritis    • Atrial fibrillation (HCC)    • Cancer (HCC)    • Cholelithiasis    • Health care maintenance    • Hyperlipidemia    • Osteopenia        Past Surgical History:   Procedure Laterality Date   • APPENDECTOMY     • BLADDER SURGERY N/A 2008   • COLONOSCOPY     • TONSILLECTOMY     • VULVA SURGERY         Social History     Socioeconomic History   • Marital status:    • Number of children: 3   Tobacco Use   • Smoking status: Never Smoker   • Smokeless tobacco: Never Used   Substance and Sexual Activity   • Alcohol use: No     Comment: caffeine use    • Drug use: No   • Sexual activity: Defer       Family History   Problem Relation Age of Onset   • Hypertension Mother    • Stroke Mother    • Cancer Mother    • Colon cancer Mother    • Heart attack Father    • Sudden death Father    • Heart disease Father    • Stroke Maternal Grandmother    • No Known Problems Maternal Grandfather    • No Known Problems Paternal Grandmother    • No Known Problems  "Paternal Grandfather        Review of Systems   Constitutional: Positive for malaise/fatigue. Negative for diaphoresis.   Cardiovascular: Negative for chest pain, claudication, dyspnea on exertion, irregular heartbeat, leg swelling, near-syncope, orthopnea, palpitations, paroxysmal nocturnal dyspnea and syncope.   Respiratory: Negative for cough, shortness of breath and sleep disturbances due to breathing.    Musculoskeletal: Negative for falls.   Neurological: Negative for dizziness and weakness.   Psychiatric/Behavioral: Negative for altered mental status and substance abuse.       Allergies   Allergen Reactions   • Latex Rash         Current Outpatient Medications:   •  cholecalciferol (VITAMIN D3) 1000 UNITS tablet, Take 1,000 Units by mouth Daily., Disp: , Rfl:   •  estradiol (ESTRACE) 0.1 MG/GM vaginal cream, Apply small amount externally and at the vaginal opening 2-3 times weekly, Disp: , Rfl:   •  fluticasone (CUTIVATE) 0.005 % ointment, Apply  topically., Disp: , Rfl:   •  triamcinolone (KENALOG) 0.5 % cream, Apply  topically., Disp: , Rfl:   •  Xarelto 20 MG tablet, TAKE 1 TABLET BY MOUTH  DAILY, Disp: 90 tablet, Rfl: 3  •  Probiotic Product (PROBIOTIC ADVANCED) capsule, Take 1 tablet by mouth Daily., Disp: , Rfl:       Objective:     Vitals:    04/06/22 1335   BP: 132/88   BP Location: Right arm   Patient Position: Sitting   Pulse: 91   Weight: 90.3 kg (199 lb)   Height: 162.6 cm (64\")     Body mass index is 34.16 kg/m².    PHYSICAL EXAM:    Physical Exam      ECG 12 Lead    Date/Time: 4/6/2022 1:53 PM  Performed by: Lisa Segal APRN  Authorized by: Lisa Segal APRN   Rhythm: atrial fibrillation  Rate: normal  QRS axis: normal    Clinical impression: abnormal EKG              Assessment:       Diagnosis Plan   1. Longstanding persistent atrial fibrillation (HCC)       Orders Placed This Encounter   Procedures   • ECG 12 Lead     This order was created via procedure documentation     Order " Specific Question:   Release to patient     Answer:   Immediate          Plan:       I told her to try to take her Xarelto with her evening meal.  This may help with some of her indigestion symptoms if she is taking it late at night now.  She seems to be getting indigestion at night.  Her heart rate is well controlled.  Her LDL was slightly elevated with her last labs but is stable.  She can see us back in a year.         Your medication list          Accurate as of April 6, 2022  2:10 PM. If you have any questions, ask your nurse or doctor.            CONTINUE taking these medications      Instructions Last Dose Given Next Dose Due   cholecalciferol 25 MCG (1000 UT) tablet  Commonly known as: VITAMIN D3      Take 1,000 Units by mouth Daily.       estradiol 0.1 MG/GM vaginal cream  Commonly known as: ESTRACE      Apply small amount externally and at the vaginal opening 2-3 times weekly       fluticasone 0.005 % ointment  Commonly known as: CUTIVATE      Apply  topically.       Probiotic Advanced capsule      Take 1 tablet by mouth Daily.       triamcinolone 0.5 % cream  Commonly known as: KENALOG      Apply  topically.       Xarelto 20 MG tablet  Generic drug: rivaroxaban      TAKE 1 TABLET BY MOUTH  DAILY          STOP taking these medications    naproxen sodium 550 MG tablet  Commonly known as: ANAPROX  Stopped by: ALEX Solorio                 As always, it has been a pleasure to participate in your patient's care.      Sincerely,     Lisa JOHNSON

## 2022-10-18 DIAGNOSIS — I48.19 PERSISTENT ATRIAL FIBRILLATION: ICD-10-CM

## 2022-10-18 RX ORDER — RIVAROXABAN 20 MG/1
TABLET, FILM COATED ORAL
Qty: 90 TABLET | Refills: 3 | Status: SHIPPED | OUTPATIENT
Start: 2022-10-18

## 2023-04-14 ENCOUNTER — OFFICE VISIT (OUTPATIENT)
Dept: CARDIOLOGY | Facility: CLINIC | Age: 83
End: 2023-04-14
Payer: MEDICARE

## 2023-04-14 VITALS
HEART RATE: 95 BPM | BODY MASS INDEX: 34.45 KG/M2 | WEIGHT: 194.4 LBS | DIASTOLIC BLOOD PRESSURE: 78 MMHG | SYSTOLIC BLOOD PRESSURE: 142 MMHG | HEIGHT: 63 IN

## 2023-04-14 DIAGNOSIS — I48.11 LONGSTANDING PERSISTENT ATRIAL FIBRILLATION: Primary | ICD-10-CM

## 2023-04-14 PROCEDURE — 93000 ELECTROCARDIOGRAM COMPLETE: CPT | Performed by: INTERNAL MEDICINE

## 2023-04-14 PROCEDURE — 99214 OFFICE O/P EST MOD 30 MIN: CPT | Performed by: INTERNAL MEDICINE

## 2023-04-14 NOTE — PROGRESS NOTES
Date of Office Visit: 23  Encounter Provider: Chuck Sauceda MD  Place of Service: [facility] CARDIOLOGY  Patient Name: Robyn Lambert  :1940  [id number]    Chief Complaint   Patient presents with   • Atrial Fibrillation   :     HPI: Robyn Lambert is a 82 y.o. female here for follow-up she has persistent A. fib she is asymptomatic with that we have rate controlled her and anticoagulate her.  She is here for follow-up and has been doing well no chest pain shortness of breath PND orthopnea edema with the exception that she has a little bit of lymphedema in her right leg she had her lymph nodes resected when she had vulvar cancer.  Otherwise she is doing well    Past Medical History:   Diagnosis Date   • Abdominal pain    • Abnormal stress test     myocardial perfusion   • Arthritis    • Atrial fibrillation    • Cancer    • Cholelithiasis    • Health care maintenance    • Hyperlipidemia    • Osteopenia        Past Surgical History:   Procedure Laterality Date   • APPENDECTOMY     • BLADDER SURGERY N/A 2008   • COLONOSCOPY     • TONSILLECTOMY     • VULVA SURGERY         Social History     Socioeconomic History   • Marital status:    • Number of children: 3   Tobacco Use   • Smoking status: Never   • Smokeless tobacco: Never   Substance and Sexual Activity   • Alcohol use: No     Comment: caffeine use    • Drug use: No   • Sexual activity: Defer       Family History   Problem Relation Age of Onset   • Hypertension Mother    • Stroke Mother    • Cancer Mother    • Colon cancer Mother    • Heart attack Father    • Sudden death Father    • Heart disease Father    • Stroke Maternal Grandmother    • No Known Problems Maternal Grandfather    • No Known Problems Paternal Grandmother    • No Known Problems Paternal Grandfather        Review of Systems   Constitutional: Negative for decreased appetite, fever, malaise/fatigue and weight loss.   HENT: Negative for  "nosebleeds.    Eyes: Negative for double vision.   Cardiovascular: Negative for chest pain, claudication, cyanosis, dyspnea on exertion, irregular heartbeat, leg swelling, near-syncope, orthopnea, palpitations, paroxysmal nocturnal dyspnea and syncope.   Respiratory: Negative for cough, hemoptysis and shortness of breath.    Hematologic/Lymphatic: Negative for bleeding problem.   Skin: Negative for rash.   Musculoskeletal: Negative for falls and myalgias.   Gastrointestinal: Negative for hematochezia, jaundice, melena, nausea and vomiting.   Genitourinary: Negative for hematuria.   Neurological: Negative for dizziness and seizures.   Psychiatric/Behavioral: Negative for altered mental status and memory loss.       Allergies   Allergen Reactions   • Latex Rash         Current Outpatient Medications:   •  cholecalciferol (VITAMIN D3) 1000 UNITS tablet, Take 1 tablet by mouth Daily., Disp: , Rfl:   •  fluticasone (CUTIVATE) 0.005 % ointment, Apply  topically., Disp: , Rfl:   •  Probiotic Product (PROBIOTIC ADVANCED) capsule, Take 1 tablet by mouth Daily., Disp: , Rfl:   •  Xarelto 20 MG tablet, TAKE 1 TABLET BY MOUTH  DAILY, Disp: 90 tablet, Rfl: 3  •  estradiol (ESTRACE) 0.1 MG/GM vaginal cream, Apply small amount externally and at the vaginal opening 2-3 times weekly, Disp: , Rfl:   •  triamcinolone (KENALOG) 0.5 % cream, Apply  topically., Disp: , Rfl:       Objective:     Vitals:    04/14/23 1326   BP: 142/78   Pulse: 95   Weight: 88.2 kg (194 lb 6.4 oz)   Height: 160 cm (63\")     Body mass index is 34.44 kg/m².    Constitutional:       Appearance: Well-developed.   Eyes:      General: No scleral icterus.  HENT:      Head: Normocephalic.   Neck:      Thyroid: No thyromegaly.      Vascular: No JVD.      Lymphadenopathy: No cervical adenopathy.   Pulmonary:      Effort: Pulmonary effort is normal.      Breath sounds: Normal breath sounds. No wheezing. No rales.   Cardiovascular:      Normal rate. Irregularly " irregular rhythm.      No gallop.   Edema:     Peripheral edema absent.   Abdominal:      Palpations: Abdomen is soft.      Tenderness: There is no abdominal tenderness.   Musculoskeletal: Normal range of motion. Skin:     General: Skin is warm and dry.      Findings: No rash.   Neurological:      Mental Status: Alert and oriented to person, place, and time.           ECG 12 Lead    Date/Time: 4/14/2023 1:57 PM  Performed by: Chuck Sauceda MD  Authorized by: Chuck Sauceda MD   Comparison: compared with previous ECG   Similar to previous ECG  Rhythm: atrial fibrillation    Clinical impression: abnormal EKG             Assessment:       Diagnosis Plan   1. Longstanding persistent atrial fibrillation               Plan:       In general I think she is doing well she is got persistent A-fib the rate is controlled really on no AV node blocking agents.  She is anticoagulated no bleeding problems.  Her 's health is really kind of declined and that that is a big weight on her she is trying to decide about possibly a trip to [place].  I will have her come back and see us in a year sooner if she has trouble    Return in about 1 year (around 4/14/2024).     As always, it has been a pleasure to participate in your patient's care.      Sincerely,       Chuck Sauceda MD

## 2023-09-29 DIAGNOSIS — I48.19 PERSISTENT ATRIAL FIBRILLATION: ICD-10-CM

## 2023-09-29 RX ORDER — RIVAROXABAN 20 MG/1
TABLET, FILM COATED ORAL
Qty: 90 TABLET | Refills: 3 | Status: SHIPPED | OUTPATIENT
Start: 2023-09-29

## 2023-10-10 ENCOUNTER — PATIENT ROUNDING (BHMG ONLY) (OUTPATIENT)
Dept: OBSTETRICS AND GYNECOLOGY | Facility: CLINIC | Age: 83
End: 2023-10-10
Payer: MEDICARE

## 2023-10-10 ENCOUNTER — OFFICE VISIT (OUTPATIENT)
Dept: OBSTETRICS AND GYNECOLOGY | Facility: CLINIC | Age: 83
End: 2023-10-10
Payer: MEDICARE

## 2023-10-10 VITALS
DIASTOLIC BLOOD PRESSURE: 80 MMHG | HEIGHT: 63 IN | SYSTOLIC BLOOD PRESSURE: 136 MMHG | BODY MASS INDEX: 34.91 KG/M2 | WEIGHT: 197 LBS

## 2023-10-10 DIAGNOSIS — Z01.419 PAP SMEAR, AS PART OF ROUTINE GYNECOLOGICAL EXAMINATION: Primary | ICD-10-CM

## 2023-10-10 DIAGNOSIS — Z85.44 PERSONAL HISTORY OF MALIGNANT NEOPLASM OF VULVA: ICD-10-CM

## 2023-10-10 DIAGNOSIS — N90.4 LICHEN SCLEROSUS ET ATROPHICUS OF THE VULVA: ICD-10-CM

## 2023-10-10 DIAGNOSIS — Z01.419 ROUTINE GYNECOLOGICAL EXAMINATION: ICD-10-CM

## 2023-10-10 DIAGNOSIS — N90.89 VULVAR LESION: ICD-10-CM

## 2023-10-10 DIAGNOSIS — C51.9 VULVAR CANCER: ICD-10-CM

## 2023-10-10 DIAGNOSIS — I48.11 LONGSTANDING PERSISTENT ATRIAL FIBRILLATION: ICD-10-CM

## 2023-10-10 NOTE — PROGRESS NOTES
"EVALUATION AND MANAGEMENT ENCOUNTER    S:  Chief Complaint   Patient presents with    Vaginal Prolapse       HPI:  Robyn Lambert is a 83 y.o.  with No LMP recorded (lmp unknown). Patient is postmenopausal. here for symptoms of pelvic organ prolapse.  Pt is here with this complaint, but states she has a labial ulcer she states.    Review of Systems   Constitutional: Negative.    HENT: Negative.     Eyes: Negative.    Respiratory: Negative.     Cardiovascular: Negative.    Gastrointestinal: Negative.    Endocrine: Negative.    Genitourinary:  Positive for vaginal pain.   Musculoskeletal: Negative.    Skin: Negative.    Allergic/Immunologic: Negative.    Neurological: Negative.    Hematological: Negative.    Psychiatric/Behavioral: Negative.     :    Patient reports that she is not currently experiencing any symptoms of urinary incontinence.      noTESTED FOR CHLAMYDIA?    Robyn Lambert  reports that she has never smoked. She has never used smokeless tobacco..       Vital Signs: /80   Ht 160 cm (63\")   Wt 89.4 kg (197 lb)   LMP  (LMP Unknown)   Breastfeeding No   BMI 34.90 kg/mý      Brief Urine Lab Results       None            Physical Exam  Vitals and nursing note reviewed.   Constitutional:       Appearance: She is well-developed.   HENT:      Head: Normocephalic and atraumatic.   Cardiovascular:      Rate and Rhythm: Normal rate.   Pulmonary:      Effort: Pulmonary effort is normal.   Abdominal:      General: There is no distension.      Palpations: Abdomen is soft. There is no mass.      Tenderness: There is no abdominal tenderness. There is no guarding.   Genitourinary:     Vagina: No vaginal discharge.          Comments: Minimal POP demonstrated.  Pt has a very swollen L labium with a large ulcerated lesion as demonstrated.  Musculoskeletal:         General: No tenderness or deformity. Normal range of motion.      Cervical back: Normal range of motion.   Skin:     General: Skin is warm " and dry.      Coloration: Skin is not pale.      Findings: No erythema or rash.   Neurological:      Mental Status: She is alert and oriented to person, place, and time.   Psychiatric:         Behavior: Behavior normal.         Thought Content: Thought content normal.         Judgment: Judgment normal.           IMPRESSION:      Recurrent of vulvar cancer.  Mild POP.    Diagnoses and all orders for this visit:    1. Pap smear, as part of routine gynecological examination (Primary)    2. Longstanding persistent atrial fibrillation    3. Lichen sclerosus et atrophicus of the vulva    4. Vulvar cancer  -     Ambulatory Referral to Gynecologic Oncology    5. Routine gynecological examination    6. Personal history of malignant neoplasm of vulva    7. Vulvar lesion: mass  -     Ambulatory Referral to Gynecologic Oncology          PLAN:      Gyn oncology.    Pt instructed to call for results of any testing done today if she does not hear from us, and that failure to do so could result in inadequate treatment . Pt verbalized her understanding.     No follow-ups on file..  Instructions and precautions given.     Time Spent: I spent 30+ minutes caring for Robyn on this date of service. This time includes time spent by me in the following activities: preparing for the visit, reviewing tests, obtaining and/or reviewing a separately obtained history, performing a medically appropriate examination and/or evaluation, counseling and educating the patient/family/caregiver, ordering medications, tests, or procedures, referring and communicating with other health care professionals, documenting information in the medical record, independently interpreting results and communicating that information with the patient/family/caregiver, and care coordination.      Mati Moore MD  10:56 EDT   10/10/23

## 2023-10-10 NOTE — PROGRESS NOTES
A MY-CHART MESSAGE HAS BEEN SENT TO THE PATIENT FOR PATIENT ROUNDING WITH [facility] [place]

## 2023-10-30 ENCOUNTER — TELEPHONE (OUTPATIENT)
Dept: CARDIOLOGY | Facility: CLINIC | Age: 83
End: 2023-10-30
Payer: MEDICARE

## 2023-10-30 NOTE — TELEPHONE ENCOUNTER
[phone]    Pt called stating she is needing cardiac clearance for surgery.    She is having an excision of a tumor from her vulva with ob/gyn oncolongy.  The surgery is scheduled with Dr. Olmstead on 11/8/23 at [facility].    Please advise.    [facility]

## 2023-10-30 NOTE — TELEPHONE ENCOUNTER
Pt notified and voiced understanding.     Also faxed via Epic to Dr. Nielsen [phone]    [facility]

## 2024-05-01 ENCOUNTER — OFFICE VISIT (OUTPATIENT)
Dept: CARDIOLOGY | Facility: CLINIC | Age: 84
End: 2024-05-01
Payer: MEDICARE

## 2024-05-01 VITALS
HEIGHT: 63 IN | BODY MASS INDEX: 32.96 KG/M2 | DIASTOLIC BLOOD PRESSURE: 78 MMHG | WEIGHT: 186 LBS | SYSTOLIC BLOOD PRESSURE: 124 MMHG | HEART RATE: 88 BPM

## 2024-05-01 DIAGNOSIS — I48.11 LONGSTANDING PERSISTENT ATRIAL FIBRILLATION: Primary | ICD-10-CM

## 2024-05-01 PROCEDURE — 93000 ELECTROCARDIOGRAM COMPLETE: CPT | Performed by: NURSE PRACTITIONER

## 2024-05-01 PROCEDURE — 1159F MED LIST DOCD IN RCRD: CPT | Performed by: NURSE PRACTITIONER

## 2024-05-01 PROCEDURE — 1160F RVW MEDS BY RX/DR IN RCRD: CPT | Performed by: NURSE PRACTITIONER

## 2024-05-01 PROCEDURE — 99213 OFFICE O/P EST LOW 20 MIN: CPT | Performed by: NURSE PRACTITIONER

## 2024-05-01 NOTE — PROGRESS NOTES
Date of Office Visit: 2024  Encounter Provider: ALEX Loaiza  Place of Service: [facility] CARDIOLOGY  Patient Name: Robyn Lambert  :1940    Chief Complaint   Patient presents with    Atrial Fibrillation   :     HPI: Robyn Lambert is a 83 y.o. female patient of Dr. Sauceda's with persistent atrial fibrillation for which we have taking a rate control and anticoagulation approach.    She was last seen in the office by Dr. Sauceda in 2023 at which time she was doing well.  No changes were made to her regimen, and she was advised to follow-up in 1 year.    From a cardiac standpoint, she has been doing well.  She denies any chest pain, shortness of breath, palpitations, edema, dizziness, syncope, bleeding difficulties or melena.  Secondary to vulvar cancer, she recently underwent a radical vulvectomy followed by radiation.  Unfortunately she is having a lot of side effects from the radiation.    Past Medical History:   Diagnosis Date    Abdominal pain     Abnormal stress test     myocardial perfusion    Arthritis     Atrial fibrillation     Cancer     Cholelithiasis     Health care maintenance     Hyperlipidemia     Osteopenia        Past Surgical History:   Procedure Laterality Date    APPENDECTOMY      BLADDER SURGERY N/A 2008    COLONOSCOPY      TONSILLECTOMY      VULVA SURGERY         Social History     Socioeconomic History    Marital status:     Number of children: 3   Tobacco Use    Smoking status: Never     Passive exposure: Never    Smokeless tobacco: Never   Vaping Use    Vaping status: Never Used   Substance and Sexual Activity    Alcohol use: No     Comment: caffeine use     Drug use: No    Sexual activity: Defer       Family History   Problem Relation Age of Onset    Hypertension Mother     Stroke Mother     Cancer Mother     Colon cancer Mother     Heart attack Father     Sudden death Father     Heart disease Father     Stroke  "Maternal Grandmother     No Known Problems Maternal Grandfather     No Known Problems Paternal Grandmother     No Known Problems Paternal Grandfather        Review of Systems   Constitutional: Negative.   Cardiovascular: Negative.  Negative for chest pain, dyspnea on exertion, leg swelling, orthopnea, paroxysmal nocturnal dyspnea and syncope.   Respiratory: Negative.     Hematologic/Lymphatic: Negative for bleeding problem.   Musculoskeletal:  Negative for falls.   Gastrointestinal:  Negative for melena.   Neurological:  Negative for dizziness and light-headedness.       Allergies   Allergen Reactions    Latex Rash         Current Outpatient Medications:     cholecalciferol (VITAMIN D3) 1000 UNITS tablet, Take 1 tablet by mouth Daily., Disp: , Rfl:     fluticasone (CUTIVATE) 0.005 % ointment, Apply  topically., Disp: , Rfl:     Probiotic Product (PROBIOTIC ADVANCED) capsule, Take 1 tablet by mouth Daily., Disp: , Rfl:     triamcinolone (KENALOG) 0.5 % cream, Apply  topically., Disp: , Rfl:     Xarelto 20 MG tablet, TAKE 1 TABLET BY MOUTH  DAILY, Disp: 90 tablet, Rfl: 3      Objective:     Vitals:    05/01/24 1319   BP: 124/78   Pulse: 88   Weight: 84.4 kg (186 lb)   Height: 160 cm (63\")     Body mass index is 32.95 kg/m².    PHYSICAL EXAM:    Neck:      Vascular: No JVD.   Pulmonary:      Effort: Pulmonary effort is normal.      Breath sounds: Normal breath sounds.   Cardiovascular:      Normal rate. Irregular rhythm.      Murmurs: There is no murmur.      No gallop.  No click. No rub.   Pulses:     Intact distal pulses.           ECG 12 Lead    Date/Time: 5/1/2024 1:26 PM  Performed by: Nehal Calderón APRN    Authorized by: Nehal Calderón APRN  Comparison: compared with previous ECG from 4/14/2023  Similar to previous ECG  Rhythm: atrial fibrillation  Rate: normal  BPM: 88            Assessment:       Diagnosis Plan   1. Longstanding persistent atrial fibrillation  ECG 12 Lead        Orders Placed This " Encounter   Procedures    ECG 12 Lead     This order was created via procedure documentation     Order Specific Question:   Release to patient     Answer:   Routine Release [[id number]]          Plan:       I think she is doing well.  She is in atrial fibrillation with a controlled ventricular rate.  She is anticoagulated on Xarelto.  I am not making any changes today, and she will follow-up with Dr. Sauceda in 1 year.      As always, it has been a pleasure to participate in your patient's care.      Sincerely,         ALEX Suazo

## 2024-11-05 DIAGNOSIS — I48.19 PERSISTENT ATRIAL FIBRILLATION: ICD-10-CM

## 2024-11-05 RX ORDER — RIVAROXABAN 20 MG/1
20 TABLET, FILM COATED ORAL DAILY
Qty: 90 TABLET | Refills: 3 | Status: SHIPPED | OUTPATIENT
Start: 2024-11-05